# Patient Record
Sex: FEMALE | Race: WHITE
[De-identification: names, ages, dates, MRNs, and addresses within clinical notes are randomized per-mention and may not be internally consistent; named-entity substitution may affect disease eponyms.]

---

## 2017-08-17 ENCOUNTER — HOSPITAL ENCOUNTER (OUTPATIENT)
Dept: HOSPITAL 61 - KCIC MAMMO | Age: 42
Discharge: HOME | End: 2017-08-17
Attending: PHYSICIAN ASSISTANT
Payer: COMMERCIAL

## 2017-08-17 DIAGNOSIS — Z12.31: Primary | ICD-10-CM

## 2017-08-17 PROCEDURE — 77063 BREAST TOMOSYNTHESIS BI: CPT

## 2017-08-17 NOTE — RAD
DATE: 8/17/2017



EXAM: MAMMO BRIE SCREENING BILATERAL



HISTORY: Routine screening



COMPARISON: Baseline study





The breast parenchyma shows scattered fibroglandular densities. Breast

parenchyma level B.





FINDINGS: There are numerous small smooth benign-appearing lymph node type

densities in the axillary regions and the axillary tail regions of both

breasts. There is an additional similar small smooth nodule located far

posteriorly near the midline of the right breast. In the presence of these

other similar nodules, this probably represents an additional lymph node. No

suspicious breast nodule or microcalcifications are evident.



IMPRESSION: There is no mammographic evidence of malignancy in either breast.

Follow-up mammography in one year is suggested.





BI-RADS CATEGORY: 2 BENIGN FINDING(S)



RECOMMENDED FOLLOW-UP: 12M 12 MONTH FOLLOW-UP



PQRS compliance statement: Patient information was entered into a reminder

system with a target due date     for the next mammogram.



Mammography is a sensitive method for finding small breast cancers, but it

does not detect them all and is not a substitute for careful clinical

examination.  A negative mammogram does not negate a clinically suspicious

finding and should not result in delay in biopsying a clinically suspicious

abnormality.



"Our facility is accredited by the [employer] Mammography

Program."

## 2019-12-16 ENCOUNTER — HOSPITAL ENCOUNTER (EMERGENCY)
Dept: HOSPITAL 61 - ER | Age: 44
Discharge: HOME | End: 2019-12-16
Payer: COMMERCIAL

## 2019-12-16 VITALS — WEIGHT: 240 LBS | BODY MASS INDEX: 38.57 KG/M2 | HEIGHT: 66 IN

## 2019-12-16 VITALS
SYSTOLIC BLOOD PRESSURE: 115 MMHG | DIASTOLIC BLOOD PRESSURE: 71 MMHG | SYSTOLIC BLOOD PRESSURE: 115 MMHG | DIASTOLIC BLOOD PRESSURE: 71 MMHG

## 2019-12-16 DIAGNOSIS — N30.00: ICD-10-CM

## 2019-12-16 DIAGNOSIS — R42: Primary | ICD-10-CM

## 2019-12-16 DIAGNOSIS — R00.2: ICD-10-CM

## 2019-12-16 LAB
ALBUMIN SERPL-MCNC: 3.5 G/DL (ref 3.4–5)
ALBUMIN/GLOB SERPL: 0.9 {RATIO} (ref 1–1.7)
ALP SERPL-CCNC: 77 U/L (ref 46–116)
ALT SERPL-CCNC: 14 U/L (ref 14–59)
ANION GAP SERPL CALC-SCNC: 9 MMOL/L (ref 6–14)
APTT PPP: YELLOW S
AST SERPL-CCNC: 11 U/L (ref 15–37)
BACTERIA #/AREA URNS HPF: (no result) /HPF
BASOPHILS # BLD AUTO: 0.1 X10^3/UL (ref 0–0.2)
BASOPHILS NFR BLD: 1 % (ref 0–3)
BILIRUB SERPL-MCNC: 0.2 MG/DL (ref 0.2–1)
BILIRUB UR QL STRIP: NEGATIVE
BUN SERPL-MCNC: 8 MG/DL (ref 7–20)
BUN/CREAT SERPL: 13 (ref 6–20)
CALCIUM SERPL-MCNC: 9 MG/DL (ref 8.5–10.1)
CHLORIDE SERPL-SCNC: 104 MMOL/L (ref 98–107)
CO2 SERPL-SCNC: 28 MMOL/L (ref 21–32)
CREAT SERPL-MCNC: 0.6 MG/DL (ref 0.6–1)
EOSINOPHIL NFR BLD: 0.1 X10^3/UL (ref 0–0.7)
EOSINOPHIL NFR BLD: 2 % (ref 0–3)
ERYTHROCYTE [DISTWIDTH] IN BLOOD BY AUTOMATED COUNT: 14 % (ref 11.5–14.5)
FIBRINOGEN PPP-MCNC: CLEAR MG/DL
GFR SERPLBLD BASED ON 1.73 SQ M-ARVRAT: 109.6 ML/MIN
GLOBULIN SER-MCNC: 4 G/DL (ref 2.2–3.8)
GLUCOSE SERPL-MCNC: 100 MG/DL (ref 70–99)
HCT VFR BLD CALC: 39.6 % (ref 36–47)
HGB BLD-MCNC: 13.2 G/DL (ref 12–15.5)
LYMPHOCYTES # BLD: 2 X10^3/UL (ref 1–4.8)
LYMPHOCYTES NFR BLD AUTO: 25 % (ref 24–48)
MCH RBC QN AUTO: 31 PG (ref 25–35)
MCHC RBC AUTO-ENTMCNC: 33 G/DL (ref 31–37)
MCV RBC AUTO: 92 FL (ref 79–100)
MONO #: 0.4 X10^3/UL (ref 0–1.1)
MONOCYTES NFR BLD: 6 % (ref 0–9)
NEUT #: 5.2 X10^3/UL (ref 1.8–7.7)
NEUTROPHILS NFR BLD AUTO: 67 % (ref 31–73)
NITRITE UR QL STRIP: NEGATIVE
PH UR STRIP: 6 [PH]
PLATELET # BLD AUTO: 285 X10^3/UL (ref 140–400)
POTASSIUM SERPL-SCNC: 4.2 MMOL/L (ref 3.5–5.1)
PROT SERPL-MCNC: 7.5 G/DL (ref 6.4–8.2)
PROT UR STRIP-MCNC: NEGATIVE MG/DL
RBC # BLD AUTO: 4.32 X10^6/UL (ref 3.5–5.4)
RBC #/AREA URNS HPF: 0 /HPF (ref 0–2)
SODIUM SERPL-SCNC: 141 MMOL/L (ref 136–145)
SQUAMOUS #/AREA URNS LPF: (no result) /LPF
UROBILINOGEN UR-MCNC: 0.2 MG/DL
WBC # BLD AUTO: 7.8 X10^3/UL (ref 4–11)
WBC #/AREA URNS HPF: (no result) /HPF (ref 0–4)

## 2019-12-16 PROCEDURE — 93005 ELECTROCARDIOGRAM TRACING: CPT

## 2019-12-16 PROCEDURE — 36415 COLL VENOUS BLD VENIPUNCTURE: CPT

## 2019-12-16 PROCEDURE — 85025 COMPLETE CBC W/AUTO DIFF WBC: CPT

## 2019-12-16 PROCEDURE — 96360 HYDRATION IV INFUSION INIT: CPT

## 2019-12-16 PROCEDURE — 71046 X-RAY EXAM CHEST 2 VIEWS: CPT

## 2019-12-16 PROCEDURE — 87086 URINE CULTURE/COLONY COUNT: CPT

## 2019-12-16 PROCEDURE — 81025 URINE PREGNANCY TEST: CPT

## 2019-12-16 PROCEDURE — 70450 CT HEAD/BRAIN W/O DYE: CPT

## 2019-12-16 PROCEDURE — 80053 COMPREHEN METABOLIC PANEL: CPT

## 2019-12-16 PROCEDURE — 81001 URINALYSIS AUTO W/SCOPE: CPT

## 2019-12-16 PROCEDURE — 84484 ASSAY OF TROPONIN QUANT: CPT

## 2019-12-16 PROCEDURE — 99285 EMERGENCY DEPT VISIT HI MDM: CPT

## 2019-12-16 NOTE — EKG
[facility]

              [address] [place] [address]

Test Date:    2019               Test Time:    11:44:57

Pat Name:     MALIK CASTILLO   Department:   

Patient ID:   [id number]           Room:          

Gender:       F                        Technician:   

:          1975               Requested By: JIA TRIPP

Order Number: [id number]           Reading MD:     

                                 Measurements

Intervals                              Axis          

Rate:         70                       P:            38

MT:           138                      QRS:          2

QRSD:         90                       T:            7

QT:           378                                    

QTc:          410                                    

                           Interpretive Statements

SINUS RHYTHM

NON SPECIFIC T ABNORMALITY

BORDERLINE ECG

No previous ECG available for comparison

## 2019-12-16 NOTE — PHYS DOC
Adult General


Chief Complaint


Chief Complaint:  DIZZY/LIGHT HEADED





HPI


HPI





Patient is a 44  year old female who presents with dizziness with movement has 

been ongoing since 6:30 yesterday morning. The patient also states she's been 

feeling hot and cold. The patient states that she's been having palpitations as 

been ongoing for the last month. She states her last menstrual period was early 

November. She states she did have a flu shot.





Review of Systems


Review of Systems





Constitutional: Denies fever or chills []


Eyes: Denies change in visual acuity, redness, or eye pain []


HENT: Denies nasal congestion or sore throat []


Respiratory: Denies cough or shortness of breath []


Cardiovascular: No additional information not addressed in HPI []


GI: Denies abdominal pain, nausea, vomiting, bloody stools or diarrhea []


: Denies dysuria or hematuria []


Musculoskeletal: Denies back pain or joint pain []


Integument: Denies rash or skin lesions []


Neurologic: Reports dizziness with movement. Denies headache, focal weakness or 

sensory changes []


Endocrine: Denies polyuria or polydipsia []





Complete systems were reviewed and found to be within normal limits, except as 

documented in this note.





Current Medications


Current Medications





Current Medications








 Medications


  (Trade)  Dose


 Ordered  Sig/Nasrin  Start Time


 Stop Time Status Last Admin


Dose Admin


 


 Sodium Chloride  1,000 ml @ 


 1,000 mls/hr  1X  STAT  19 11:34


 19 12:33 DC 19 12:42


1,000 MLS/HR











Allergies


Allergies





Allergies








Coded Allergies Type Severity Reaction Last Updated Verified


 


  No Known Drug Allergies    19 No











Physical Exam


Physical Exam





Constitutional: Well developed, well nourished, no acute distress, non-toxic 

appearance. []


HENT: Normocephalic, atraumatic, bilateral external ears normal, oropharynx 

moist, no oral exudates, nose normal. []


Eyes: PERRLA, EOMI, conjunctiva normal, no discharge. [] 


Neck: Normal range of motion, no tenderness, supple, no stridor. [] 


Cardiovascular:Heart rate regular rhythm, no murmur []


Lungs & Thorax:  Bilateral breath sounds clear to auscultation []


Abdomen: Bowel sounds normal, soft, no tenderness, no masses, no pulsatile 

masses. [] 


Skin: Warm, dry, no erythema, no rash. [] 


Back: No tenderness, no CVA tenderness. [] 


Extremities: No tenderness, no cyanosis, no clubbing, ROM intact, no edema. [] 


Neurologic: Alert and oriented X 3, normal motor function, normal sensory 

function, no focal deficits noted. []


Psychologic: Affect normal, judgement normal, mood normal. []





Current Patient Data


Vital Signs





                                   Vital Signs








  Date Time  Temp Pulse Resp B/P (MAP) Pulse Ox O2 Delivery O2 Flow Rate FiO2


 


19 11:20 98.8 78 16 135/67 (89) 98 Room Air  





 98.8       








Lab Values





                                Laboratory Tests








Test


 19


11:20 19


11:26 19


12:12


 


Urine Collection Type Unknown    


 


Urine Color Yellow    


 


Urine Clarity Clear    


 


Urine pH 6.0    


 


Urine Specific Gravity 1.010    


 


Urine Protein


 Negative mg/dL


(NEG-TRACE) 


 





 


Urine Glucose (UA)


 Negative mg/dL


(NEG) 


 





 


Urine Ketones (Stick)


 Negative mg/dL


(NEG) 


 





 


Urine Blood


 Negative (NEG)


 


 





 


Urine Nitrite


 Negative (NEG)


 


 





 


Urine Bilirubin


 Negative (NEG)


 


 





 


Urine Urobilinogen Dipstick


 0.2 mg/dL (0.2


mg/dL) 


 





 


Urine Leukocyte Esterase Trace (NEG)    


 


Urine RBC 0 /HPF (0-2)    


 


Urine WBC


 Occ /HPF (0-4)


 


 





 


Urine Squamous Epithelial


Cells Many /LPF  


 


 





 


Urine Bacteria


 Few /HPF


(0-FEW) 


 





 


POC Urine HCG, Qualitative


 


 Hcg negative


(Negative) 





 


White Blood Count


 


 


 7.8 x10^3/uL


(4.0-11.0)


 


Red Blood Count


 


 


 4.32 x10^6/uL


(3.50-5.40)


 


Hemoglobin


 


 


 13.2 g/dL


(12.0-15.5)


 


Hematocrit


 


 


 39.6 %


(36.0-47.0)


 


Mean Corpuscular Volume


 


 


 92 fL ()





 


Mean Corpuscular Hemoglobin   31 pg (25-35)  


 


Mean Corpuscular Hemoglobin


Concent 


 


 33 g/dL


(31-37)


 


Red Cell Distribution Width


 


 


 14.0 %


(11.5-14.5)


 


Platelet Count


 


 


 285 x10^3/uL


(140-400)


 


Neutrophils (%) (Auto)   67 % (31-73)  


 


Lymphocytes (%) (Auto)   25 % (24-48)  


 


Monocytes (%) (Auto)   6 % (0-9)  


 


Eosinophils (%) (Auto)   2 % (0-3)  


 


Basophils (%) (Auto)   1 % (0-3)  


 


Neutrophils # (Auto)


 


 


 5.2 x10^3/uL


(1.8-7.7)


 


Lymphocytes # (Auto)


 


 


 2.0 x10^3/uL


(1.0-4.8)


 


Monocytes # (Auto)


 


 


 0.4 x10^3/uL


(0.0-1.1)


 


Eosinophils # (Auto)


 


 


 0.1 x10^3/uL


(0.0-0.7)


 


Basophils # (Auto)


 


 


 0.1 x10^3/uL


(0.0-0.2)


 


Sodium Level


 


 


 141 mmol/L


(136-145)


 


Potassium Level


 


 


 4.2 mmol/L


(3.5-5.1)


 


Chloride Level


 


 


 104 mmol/L


()


 


Carbon Dioxide Level


 


 


 28 mmol/L


(21-32)


 


Anion Gap   9 (6-14)  


 


Blood Urea Nitrogen


 


 


 8 mg/dL (7-20)





 


Creatinine


 


 


 0.6 mg/dL


(0.6-1.0)


 


Estimated GFR


(Cockcroft-Gault) 


 


 109.6  





 


BUN/Creatinine Ratio   13 (6-20)  


 


Glucose Level


 


 


 100 mg/dL


(70-99)  H


 


Calcium Level


 


 


 9.0 mg/dL


(8.5-10.1)


 


Total Bilirubin


 


 


 0.2 mg/dL


(0.2-1.0)


 


Aspartate Amino Transferase


(AST) 


 


 11 U/L (15-37)


L


 


Alanine Aminotransferase (ALT)


 


 


 14 U/L (14-59)





 


Alkaline Phosphatase


 


 


 77 U/L


()


 


Troponin I Quantitative


 


 


 < 0.017 ng/mL


(0.000-0.055)


 


Total Protein


 


 


 7.5 g/dL


(6.4-8.2)


 


Albumin


 


 


 3.5 g/dL


(3.4-5.0)


 


Albumin/Globulin Ratio


 


 


 0.9 (1.0-1.7)


L





                                Laboratory Tests


19 12:12








                                Laboratory Tests


19 12:12














EKG


EKG


EKG interpreted by Dr. Thao Bynum with rate of 70. []





Radiology/Procedures


Radiology/Procedures





                            [facility]


                    [address]  [place], [place] [id number] [phone]


                                        


                                 IMAGING REPORT





                                     Signed





PATIENT: MALIK CASTILLO PACCOUNT: [id number]     MRN#: [id number]


: 1975           LOCATION: ER              AGE: 44


SEX: F                    EXAM DT: 19         ACCESSION#: [id number]


STATUS: REG ER            ORD. PHYSICIAN: JIA TRIPP


REASON: dizziness


PROCEDURE: CT HEAD WO CONTRAST





EXAM: Head CT without contrast.


 


HISTORY: Dizziness.


 


TECHNIQUE: Computed tomographic images of the head were obtained without 


contrast. 


*One or more of the following individualized dose reduction techniques 


were utilized for this examination:  


1. Automated exposure control.  


2. Adjustment of the mA and/or kV according to patient size.  


3. Use of iterative reconstruction technique.


 


COMPARISON: None.


 


FINDINGS: There is no acute or subacute extra-axial or intraparenchymal 


hemorrhage. There is no mass effect or midline shift. There is no 


hydrocephalus. 


 


The gray-white matter differentiation pattern is intact.


 


There is evidence of lens surgery. The mastoid air cells are clear. The 


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 There is no 


suspicious calvarial lesion.


 


IMPRESSION: No acute intracranial finding. Note is made that MRI is more 


sensitive for acute infarction.


 


Electronically signed by: Edyta Marks MD (2019 12:23 PM) 


[facility]














DICTATED and SIGNED BY:     EDYTA MARKS MD


DATE:     19 1223




















[][facility]


                    [address] [place] [address]


                                 [phone]


                                        


                                 IMAGING REPORT





                                     Signed





PATIENT: MALIK CASTILLO PACCOUNT: [id number]     MRN#: [id number]


: 1975           LOCATION: ER              AGE: 44


SEX: F                    EXAM DT: 19         ACCESSION#: [id number]


STATUS: REG ER            ORD. PHYSICIAN: JIA TRIPP


REASON: dizziness, palpitations


PROCEDURE: CHEST PA & LATERAL





PA and lateral views of the chest. 


 


Comparison:  None.


 


Indication:  Dizziness and palpitations


 


Findings: 


 


The heart size is normal. No pneumothorax or effusion.  No air space or 


interstitial disease. The bony structures are intact.


 


Impression: 


1. No acute cardiopulmonary process.  


 


 


Electronically signed by: Butch Duran MD (2019 12:13 PM) [facility] [place]














DICTATED and SIGNED BY:     BUTCH DURAN MD


DATE:     19 1213





Course & Med Decision Making


Course & Med Decision Making


Pertinent Labs and Imaging studies reviewed. (See chart for details)





Will get EKG, labs, chest x-ray, and CT head. Will also get UA/Urine Pregnancy.





Labs are unremarkable.





Urine shows leukocytes.





Imaging is unremarkable. Will d/c home to follow up with primary care.








EKG is unremarkable.





Dragon Disclaimer


Dragon Disclaimer


This electronic medical record was generated, in whole or in part, using a voice

 recognition dictation system.





Departure


Departure


Impression:  


   Primary Impression:  


   Dizziness


   Additional Impressions:  


   Palpitations


   Urinary tract infection


Disposition:   HOME, SELF-CARE


Condition:  STABLE


Referrals:  


RICHIE SINGLETON MD (PCP)


Patient Instructions:  Dizziness, Palpitations, Urinary Tract Infection





Additional Instructions:  


Thank you for visiting . We appreciate you trusting us 

with your care. If any additional problems come up don't hesitate to return to 

visit us. Please follow up with your primary care provider so they can plan 

additional care if needed and know about the problem that you had. If symptoms 

worsen come back to the Emergency Department. Any concerning symptoms that start

 such as chest pain, shortness of air, weakness or numbness on one side of the 

body, running high fevers or any other concerning symptoms return to the ER.





You have been prescribed an antibiotic today to help fight your infection. 

Please take all of the antibiotic as directed. If after 48 hours the infection 

is not improving, please return for more care. If the infection worsens, return 

to ER for additional care.


Scripts


Cephalexin (KEFLEX) 500 Mg Capsule


1 CAP PO BID for 7 Days, #14 CAP 0 Refills


   Prov: JIA TRIPP         19





Problem Qualifiers








   Additional Impressions:  


   Urinary tract infection


   Urinary tract infection type:  acute cystitis  Hematuria presence:  without 

   hematuria  Qualified Codes:  N30.00 - Acute cystitis without hematuria








JIA TRIPP          Dec 16, 2019 11:39

## 2019-12-16 NOTE — RAD
PA and lateral views of the chest. 

 

Comparison:  None.

 

Indication:  Dizziness and palpitations

 

Findings: 

 

The heart size is normal. No pneumothorax or effusion.  No air space or 

interstitial disease. The bony structures are intact.

 

Impression: 

1. No acute cardiopulmonary process.  

 

 

Electronically signed by: Butch Duran MD (12/16/2019 12:13 PM) [facility]

## 2019-12-16 NOTE — RAD
EXAM: Head CT without contrast.

 

HISTORY: Dizziness.

 

TECHNIQUE: Computed tomographic images of the head were obtained without 

contrast. 

*One or more of the following individualized dose reduction techniques 

were utilized for this examination:  

1. Automated exposure control.  

2. Adjustment of the mA and/or kV according to patient size.  

3. Use of iterative reconstruction technique.

 

COMPARISON: None.

 

FINDINGS: There is no acute or subacute extra-axial or intraparenchymal 

hemorrhage. There is no mass effect or midline shift. There is no 

hydrocephalus. 

 

The gray-white matter differentiation pattern is intact.

 

There is evidence of lens surgery. The mastoid air cells are clear. The 

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 There is no 

suspicious calvarial lesion.

 

IMPRESSION: No acute intracranial finding. Note is made that MRI is more 

sensitive for acute infarction.

 

Electronically signed by: Edyta Jones MD (12/16/2019 12:23 PM) 

[facility]

## 2021-01-14 ENCOUNTER — HOSPITAL ENCOUNTER (OUTPATIENT)
Dept: HOSPITAL 61 - MAMMO | Age: 46
End: 2021-01-14
Attending: PHYSICIAN ASSISTANT
Payer: COMMERCIAL

## 2021-01-14 DIAGNOSIS — R92.2: Primary | ICD-10-CM

## 2021-01-14 DIAGNOSIS — N63.11: ICD-10-CM

## 2021-01-14 PROCEDURE — 77066 DX MAMMO INCL CAD BI: CPT

## 2021-01-14 PROCEDURE — 76641 ULTRASOUND BREAST COMPLETE: CPT

## 2021-01-14 NOTE — RAD
Examination:

1. Bilateral digital diagnostic mammogram.

2. Limited right breast ultrasound.



INDICATION: Bilateral breast pain. Patient is a 45-year-old woman due for mammographic screening who 
a month ago had a single episode of bilateral breast nipple tingling first on the left then on the ri
ght. She reports a family history of breast cancer in 2 sisters at approximately her age.



COMPARISON: Bilateral mammogram of 8/17/2017.



TECHNIQUE: CC and MLO views of both breasts were obtained with 2-D and 3-D technique and reviewed wit
h computer-aided detection. Thereafter, targeted ultrasound of the right breast was performed.



FINDINGS:



Scattered fibroglandular densities.



Negative left mammogram.



A 9 mm isodense mass in the upper outer right breast with obscured margins is mammographically new an
d needs additional imaging with targeted right breast ultrasound.



Targeted right breast ultrasound showed a parallel orientation oval 9 mm mass with irregular, occasio
camille angular margins at the 10:00 position 7 cm from nipple that correlates with the mammographic fi
nding. Sonographic survey of the right axilla reveals no adenopathy. The subareolar right breast is a
lso unremarkable.



IMPRESSION:

Suspicious 9 mm mass in the upper-outer quadrant right breast.



BI-RADS Category 4

Findings suspicious for malignancy.

Ultrasound-guided right breast core needle biopsy is recommended.



Discussed with the patient. Discussed with the patient's referring provider Edyta Lainez PA-C by telepho
ne at 10:58 AM on 1/14/2021.



Electronically signed by: James Scales MD (1/14/2021 2:32 PM) [id number]

## 2021-01-14 NOTE — RAD
Examination:

1. Bilateral digital diagnostic mammogram.

2. Limited right breast ultrasound.



INDICATION: Bilateral breast pain. Patient is a 45-year-old woman due for mammographic screening who 
a month ago had a single episode of bilateral breast nipple tingling first on the left then on the ri
ght. She reports a family history of breast cancer in 2 sisters at approximately her age.



COMPARISON: Bilateral mammogram of 8/17/2017.



TECHNIQUE: CC and MLO views of both breasts were obtained with 2-D and 3-D technique and reviewed wit
h computer-aided detection. Thereafter, targeted ultrasound of the right breast was performed.



FINDINGS:



Scattered fibroglandular densities.



Negative left mammogram.



A 9 mm isodense mass in the upper outer right breast with obscured margins is mammographically new an
d needs additional imaging with targeted right breast ultrasound.



Targeted right breast ultrasound showed a parallel orientation oval 9 mm mass with irregular, occasio
camille angular margins at the 10:00 position 7 cm from nipple that correlates with the mammographic fi
nding. Sonographic survey of the right axilla reveals no adenopathy. The subareolar right breast is a
lso unremarkable.



IMPRESSION:

Suspicious 9 mm mass in the upper-outer quadrant right breast.



BI-RADS Category 4

Findings suspicious for malignancy.

Ultrasound-guided right breast core needle biopsy is recommended.



Discussed with the patient. Discussed with the patient's referring provider Edyta Lainez PA-C by telepho
ne at 10:58 AM on 1/14/2021.



Electronically signed by: James Scales MD (1/14/2021 2:32 PM) [id number]